# Patient Record
Sex: FEMALE | Employment: FULL TIME | URBAN - METROPOLITAN AREA
[De-identification: names, ages, dates, MRNs, and addresses within clinical notes are randomized per-mention and may not be internally consistent; named-entity substitution may affect disease eponyms.]

---

## 2018-09-25 ENCOUNTER — SEE NOTE (OUTPATIENT)
Dept: URBAN - METROPOLITAN AREA CLINIC 36 | Facility: CLINIC | Age: 53
Setting detail: DERMATOLOGY
End: 2018-09-25

## 2018-09-25 PROCEDURE — OTHER PEEL PCA CUSTOM PEEL: OTHER

## 2018-11-01 ENCOUNTER — SEE NOTE (OUTPATIENT)
Dept: URBAN - METROPOLITAN AREA CLINIC 36 | Facility: CLINIC | Age: 53
Setting detail: DERMATOLOGY
End: 2018-11-01

## 2018-11-01 PROCEDURE — 11900 INJECT SKIN LESIONS </W 7: CPT

## 2018-12-11 ENCOUNTER — FOLLOW UP (OUTPATIENT)
Dept: URBAN - METROPOLITAN AREA CLINIC 36 | Facility: CLINIC | Age: 53
Setting detail: DERMATOLOGY
End: 2018-12-11

## 2018-12-11 PROCEDURE — 11900 INJECT SKIN LESIONS </W 7: CPT

## 2018-12-11 PROCEDURE — 99213 OFFICE O/P EST LOW 20 MIN: CPT

## 2018-12-11 PROCEDURE — 11100 BX SKIN SUBCUTANEOUS&/MUCOUS MEMBRANE 1 LESION: CPT

## 2018-12-11 PROCEDURE — 36415 COLL VENOUS BLD VENIPUNCTURE: CPT

## 2018-12-27 ENCOUNTER — FOLLOW UP (OUTPATIENT)
Dept: URBAN - METROPOLITAN AREA CLINIC 36 | Facility: CLINIC | Age: 53
Setting detail: DERMATOLOGY
End: 2018-12-27

## 2018-12-27 PROCEDURE — 99024 POSTOP FOLLOW-UP VISIT: CPT

## 2019-01-15 ENCOUNTER — SEE NOTE (OUTPATIENT)
Dept: URBAN - METROPOLITAN AREA CLINIC 36 | Facility: CLINIC | Age: 54
Setting detail: DERMATOLOGY
End: 2019-01-15

## 2019-01-15 PROCEDURE — 99213 OFFICE O/P EST LOW 20 MIN: CPT

## 2019-01-15 PROCEDURE — 11900 INJECT SKIN LESIONS </W 7: CPT

## 2019-01-16 ENCOUNTER — RX ONLY (RX ONLY)
Age: 54
End: 2019-01-16

## 2019-01-16 RX ORDER — DOXYCYCLINE 40 MG/1
1 CAPSULE CAPSULE ORAL DAILY
Qty: 30 | Refills: 3 | Status: DISCONTINUED
Start: 2019-01-16 | End: 2019-01-16

## 2019-01-16 RX ORDER — DOXYCYCLINE 40 MG/1
1 CAPSULE CAPSULE ORAL DAILY
Qty: 30 | Refills: 3
Start: 2019-01-16

## 2019-01-17 ENCOUNTER — RX ONLY (RX ONLY)
Age: 54
End: 2019-01-17

## 2019-01-17 RX ORDER — FLUCONAZOLE 150 MG/1
1 TABLET TABLET ORAL AS DIRECTED
Qty: 2 | Refills: 0
Start: 2019-01-17

## 2019-02-19 ENCOUNTER — FOLLOW UP (OUTPATIENT)
Dept: URBAN - METROPOLITAN AREA CLINIC 36 | Facility: CLINIC | Age: 54
Setting detail: DERMATOLOGY
End: 2019-02-19

## 2019-02-19 DIAGNOSIS — B35.3 TINEA PEDIS: ICD-10-CM

## 2019-02-19 PROCEDURE — 99213 OFFICE O/P EST LOW 20 MIN: CPT

## 2020-09-15 ENCOUNTER — WEB ENCOUNTER (OUTPATIENT)
Dept: URBAN - METROPOLITAN AREA CLINIC 94 | Facility: CLINIC | Age: 55
End: 2020-09-15

## 2020-09-15 ENCOUNTER — OFFICE VISIT (OUTPATIENT)
Dept: URBAN - METROPOLITAN AREA CLINIC 94 | Facility: CLINIC | Age: 55
End: 2020-09-15
Payer: COMMERCIAL

## 2020-09-15 ENCOUNTER — DASHBOARD ENCOUNTERS (OUTPATIENT)
Age: 55
End: 2020-09-15

## 2020-09-15 DIAGNOSIS — K58.1 IRRITABLE BOWEL SYNDROME WITH CONSTIPATION: ICD-10-CM

## 2020-09-15 PROBLEM — 440630006: Status: ACTIVE | Noted: 2020-09-15

## 2020-09-15 PROCEDURE — 99213 OFFICE O/P EST LOW 20 MIN: CPT | Performed by: INTERNAL MEDICINE

## 2020-09-15 PROCEDURE — G8427 DOCREV CUR MEDS BY ELIG CLIN: HCPCS | Performed by: INTERNAL MEDICINE

## 2020-09-15 PROCEDURE — G9903 PT SCRN TBCO ID AS NON USER: HCPCS | Performed by: INTERNAL MEDICINE

## 2020-09-15 PROCEDURE — 3017F COLORECTAL CA SCREEN DOC REV: CPT | Performed by: INTERNAL MEDICINE

## 2020-09-15 RX ORDER — LINACLOTIDE 290 UG/1
1 CAPSULE AT LEAST 30 MINUTES BEFORE THE FIRST MEAL OF THE DAY ON AN EMPTY STOMACH CAPSULE, GELATIN COATED ORAL ONCE A DAY
Qty: 60 | Refills: 2 | OUTPATIENT
Start: 2020-09-15 | End: 2020-12-13

## 2020-09-15 RX ORDER — LINACLOTIDE 145 UG/1
TAKE 1 CAPSULE (145 MCG) BY ORAL ROUTE ONCE DAILY ON AN EMPTY STOMACH AT LEAST 30 MINUTES BEFORE 1ST MEAL OF THE DAY FOR 30 DAYS CAPSULE, GELATIN COATED ORAL 1
Qty: 30 | Refills: 6 | Status: ACTIVE | COMMUNITY
Start: 2020-04-08 | End: 2020-11-04

## 2020-09-15 RX ORDER — PANTOPRAZOLE SODIUM 40 MG/1
1 TABLET TABLET, DELAYED RELEASE ORAL ONCE A DAY
Qty: 60 | Refills: 2 | OUTPATIENT
Start: 2020-09-15

## 2020-09-15 RX ORDER — PANTOPRAZOLE SODIUM 40 MG/1
TAKE 1 TABLET (40 MG) BY ORAL ROUTE ONCE DAILY FOR 30 DAYS TABLET, DELAYED RELEASE ORAL 1
Qty: 30 | Refills: 6 | Status: ACTIVE | COMMUNITY
Start: 2020-04-08 | End: 2020-11-04

## 2020-09-15 RX ORDER — DICYCLOMINE HYDROCHLORIDE 20 MG/1
1 TABLET TABLET ORAL
Qty: 120 | Refills: 2 | OUTPATIENT
Start: 2020-09-15

## 2020-09-15 NOTE — HPI-TODAY'S VISIT:
54 y/o with hx of IBS-C, GERD here for f/u, last teleheatlh visit by Dr Jama 04/2020  Lower cramping abdominal pain, relieved with BM. Constipation with BM 1-2/week.  Was treated with amitiza, miralax, no benefit. Switched to Linzess last time Colonoscopy 05/2018: Normal  Today, reports having the lower abominal pain as mentioned above. Is constipated. Linzess 145mcg did not help.   Also has bitter taste in mouth with heartburn. Not on any medicine

## 2020-09-22 ENCOUNTER — TELEPHONE ENCOUNTER (OUTPATIENT)
Dept: URBAN - METROPOLITAN AREA CLINIC 94 | Facility: CLINIC | Age: 55
End: 2020-09-22

## 2020-09-22 RX ORDER — PANTOPRAZOLE SODIUM 40 MG/1
1 TABLET TABLET, DELAYED RELEASE ORAL ONCE A DAY
Qty: 60 | Refills: 2 | Status: ACTIVE | COMMUNITY
Start: 2020-09-15

## 2020-09-22 RX ORDER — LINACLOTIDE 145 UG/1
TAKE 1 CAPSULE (145 MCG) BY ORAL ROUTE ONCE DAILY ON AN EMPTY STOMACH AT LEAST 30 MINUTES BEFORE 1ST MEAL OF THE DAY FOR 30 DAYS CAPSULE, GELATIN COATED ORAL 1
Qty: 30 | Refills: 6 | Status: ACTIVE | COMMUNITY
Start: 2020-04-08 | End: 2020-11-04

## 2020-09-22 RX ORDER — LINACLOTIDE 145 UG/1
1 CAPSULE AT LEAST 30 MINUTES BEFORE THE FIRST MEAL OF THE DAY ON AN EMPTY STOMACH CAPSULE, GELATIN COATED ORAL ONCE A DAY
Qty: 60 | Refills: 2 | OUTPATIENT
Start: 2020-09-22 | End: 2020-12-20

## 2020-09-22 RX ORDER — LINACLOTIDE 290 UG/1
1 CAPSULE AT LEAST 30 MINUTES BEFORE THE FIRST MEAL OF THE DAY ON AN EMPTY STOMACH CAPSULE, GELATIN COATED ORAL ONCE A DAY
Qty: 60 | Refills: 2 | Status: ACTIVE | COMMUNITY
Start: 2020-09-15 | End: 2020-12-13

## 2020-09-22 RX ORDER — DICYCLOMINE HYDROCHLORIDE 20 MG/1
1 TABLET TABLET ORAL
Qty: 120 | Refills: 2 | Status: ACTIVE | COMMUNITY
Start: 2020-09-15

## 2020-09-22 RX ORDER — PANTOPRAZOLE SODIUM 40 MG/1
TAKE 1 TABLET (40 MG) BY ORAL ROUTE ONCE DAILY FOR 30 DAYS TABLET, DELAYED RELEASE ORAL 1
Qty: 30 | Refills: 6 | Status: ACTIVE | COMMUNITY
Start: 2020-04-08 | End: 2020-11-04

## 2020-10-08 ENCOUNTER — ERX REFILL RESPONSE (OUTPATIENT)
Dept: URBAN - METROPOLITAN AREA CLINIC 13 | Facility: CLINIC | Age: 55
End: 2020-10-08

## 2020-10-08 RX ORDER — DICYCLOMINE HYDROCHLORIDE 20 MG/1
TAKE 1 TABLET FOUR TIMES A DAY AS NEEDED BEFORE MEALS AND BEDTIME ORALLY 30 DAY(S) TABLET ORAL
Qty: 120 | Refills: 2

## 2020-12-15 ENCOUNTER — OFFICE VISIT (OUTPATIENT)
Dept: URBAN - METROPOLITAN AREA CLINIC 94 | Facility: CLINIC | Age: 55
End: 2020-12-15

## 2020-12-15 RX ORDER — PANTOPRAZOLE SODIUM 40 MG/1
1 TABLET TABLET, DELAYED RELEASE ORAL ONCE A DAY
Qty: 60 | Refills: 2 | Status: ACTIVE | COMMUNITY
Start: 2020-09-15

## 2020-12-15 RX ORDER — LINACLOTIDE 145 UG/1
1 CAPSULE AT LEAST 30 MINUTES BEFORE THE FIRST MEAL OF THE DAY ON AN EMPTY STOMACH CAPSULE, GELATIN COATED ORAL ONCE A DAY
Qty: 60 | Refills: 2 | Status: ACTIVE | COMMUNITY
Start: 2020-09-22 | End: 2020-12-20

## 2020-12-15 RX ORDER — DICYCLOMINE HYDROCHLORIDE 20 MG/1
TAKE 1 TABLET FOUR TIMES A DAY AS NEEDED BEFORE MEALS AND BEDTIME ORALLY 30 DAY(S) TABLET ORAL
Qty: 120 | Refills: 2 | Status: ACTIVE | COMMUNITY

## 2021-01-26 ENCOUNTER — OFFICE VISIT (OUTPATIENT)
Dept: URBAN - METROPOLITAN AREA CLINIC 94 | Facility: CLINIC | Age: 56
End: 2021-01-26

## 2021-01-26 RX ORDER — PANTOPRAZOLE SODIUM 40 MG/1
1 TABLET TABLET, DELAYED RELEASE ORAL ONCE A DAY
Qty: 60 | Refills: 2 | Status: ACTIVE | COMMUNITY
Start: 2020-09-15

## 2021-01-26 RX ORDER — DICYCLOMINE HYDROCHLORIDE 20 MG/1
TAKE 1 TABLET FOUR TIMES A DAY AS NEEDED BEFORE MEALS AND BEDTIME ORALLY 30 DAY(S) TABLET ORAL
Qty: 120 | Refills: 2 | Status: ACTIVE | COMMUNITY

## 2021-03-31 ENCOUNTER — ERX REFILL RESPONSE (OUTPATIENT)
Dept: URBAN - METROPOLITAN AREA CLINIC 94 | Facility: CLINIC | Age: 56
End: 2021-03-31

## 2021-03-31 RX ORDER — DICYCLOMINE HYDROCHLORIDE 20 MG/1
TAKE 1 TABLET FOUR TIMES A DAY AS NEEDED BEFORE MEALS AND BEDTIME ORALLY 30 DAY(S) TABLET ORAL
Qty: 360 | Refills: 0

## 2021-06-18 ENCOUNTER — OFFICE VISIT (OUTPATIENT)
Dept: URBAN - METROPOLITAN AREA CLINIC 94 | Facility: CLINIC | Age: 56
End: 2021-06-18

## 2021-06-18 RX ORDER — DICYCLOMINE HYDROCHLORIDE 20 MG/1
TAKE 1 TABLET FOUR TIMES A DAY AS NEEDED BEFORE MEALS AND BEDTIME ORALLY 30 DAY(S) TABLET ORAL
Qty: 360 | Refills: 0 | Status: ACTIVE | COMMUNITY

## 2021-06-18 RX ORDER — PANTOPRAZOLE SODIUM 40 MG/1
1 TABLET TABLET, DELAYED RELEASE ORAL ONCE A DAY
Qty: 60 | Refills: 2 | Status: ACTIVE | COMMUNITY
Start: 2020-09-15

## 2021-07-29 ENCOUNTER — ERX REFILL RESPONSE (OUTPATIENT)
Dept: URBAN - METROPOLITAN AREA CLINIC 94 | Facility: CLINIC | Age: 56
End: 2021-07-29

## 2021-07-29 RX ORDER — PANTOPRAZOLE SODIUM 40 MG/1
TAKE 1 TABLET BY MOUTH EVERY DAY TABLET, DELAYED RELEASE ORAL
Qty: 30 TABLET | Refills: 4 | OUTPATIENT

## 2021-07-29 RX ORDER — PANTOPRAZOLE SODIUM 40 MG/1
1 TABLET TABLET, DELAYED RELEASE ORAL ONCE A DAY
Qty: 60 | Refills: 2 | OUTPATIENT